# Patient Record
Sex: MALE | Race: WHITE | URBAN - METROPOLITAN AREA
[De-identification: names, ages, dates, MRNs, and addresses within clinical notes are randomized per-mention and may not be internally consistent; named-entity substitution may affect disease eponyms.]

---

## 2024-09-20 ENCOUNTER — OCCMED (OUTPATIENT)
Dept: URGENT CARE | Facility: CLINIC | Age: 29
End: 2024-09-20

## 2024-09-20 DIAGNOSIS — Z02.89 ENCOUNTER FOR EXAMINATION REQUIRED BY DEPARTMENT OF TRANSPORTATION (DOT): Primary | ICD-10-CM

## 2025-03-17 ENCOUNTER — OFFICE VISIT (OUTPATIENT)
Dept: URGENT CARE | Facility: CLINIC | Age: 30
End: 2025-03-17
Payer: COMMERCIAL

## 2025-03-17 VITALS
WEIGHT: 203 LBS | OXYGEN SATURATION: 98 % | RESPIRATION RATE: 18 BRPM | HEIGHT: 69 IN | DIASTOLIC BLOOD PRESSURE: 78 MMHG | SYSTOLIC BLOOD PRESSURE: 124 MMHG | TEMPERATURE: 99 F | HEART RATE: 94 BPM | BODY MASS INDEX: 30.07 KG/M2

## 2025-03-17 DIAGNOSIS — A08.4 VIRAL GASTROENTERITIS: Primary | ICD-10-CM

## 2025-03-17 PROCEDURE — 99213 OFFICE O/P EST LOW 20 MIN: CPT | Performed by: PHYSICIAN ASSISTANT

## 2025-03-17 RX ORDER — HYDROXYZINE HYDROCHLORIDE 25 MG/1
25 TABLET, FILM COATED ORAL
COMMUNITY
Start: 2024-09-30

## 2025-03-17 RX ORDER — ONDANSETRON 4 MG/1
4 TABLET, FILM COATED ORAL EVERY 8 HOURS PRN
Qty: 20 TABLET | Refills: 0 | Status: SHIPPED | OUTPATIENT
Start: 2025-03-17

## 2025-03-17 RX ORDER — ARIPIPRAZOLE 5 MG/1
2.5 TABLET ORAL
COMMUNITY
Start: 2025-01-02

## 2025-03-17 NOTE — PROGRESS NOTES
[facility]        NAME: Jeremie Keita is a 30 y.o. male  : 1995    MRN: [id number]  DATE: 2025  TIME: 10:58 AM    Assessment and Plan   Viral gastroenteritis [A08.4]  1. Viral gastroenteritis  ondansetron (ZOFRAN) 4 mg tablet            Patient Instructions     Patient Instructions   Patient's symptoms are consistent with a viral stomach bug.    Recommended Zofran use as needed for nausea and vomiting.  Advised to stay hydrated and drink plenty of fluids.    Follow up with PCP in 3-5 days.  Proceed to  ER if symptoms worsen.    If tests are performed, our office will contact you with results only if changes need to made to the care plan discussed with you at the visit. You can review your full results on [facility].        Chief Complaint     Chief Complaint   Patient presents with    Abdominal Pain     Pt states he has abdominal pain, diarrhea and back pain for 2 days.          History of Present Illness       Abdominal Pain  This is a new problem. Episode onset: 2 days ago. The onset quality is sudden. The problem occurs constantly. The problem has been unchanged. The pain is located in the generalized abdominal region. The pain is mild. The quality of the pain is aching and cramping. The abdominal pain radiates to the back. Associated symptoms include diarrhea. Pertinent negatives include no anorexia, constipation, fever, nausea or vomiting. The pain is aggravated by certain positions. The pain is relieved by Nothing.       Review of Systems   Review of Systems   Constitutional:  Negative for fever.   Gastrointestinal:  Positive for abdominal pain and diarrhea. Negative for anorexia, constipation, nausea and vomiting.   All other systems reviewed and are negative.        Current Medications       Current Outpatient Medications:     ARIPiprazole (ABILIFY) 5 mg tablet, Take 2.5 mg by mouth, Disp: , Rfl:     hydrOXYzine HCL (ATARAX) 25 mg tablet, Take 25 mg by mouth, Disp: ,  "Rfl:     ondansetron (ZOFRAN) 4 mg tablet, Take 1 tablet (4 mg total) by mouth every 8 (eight) hours as needed for nausea or vomiting, Disp: 20 tablet, Rfl: 0    Current Allergies     Allergies as of 03/17/2025 - Reviewed 03/17/2025   Allergen Reaction Noted    Buspirone Anxiety 07/12/2024            The following portions of the patient's history were reviewed and updated as appropriate: allergies, current medications, past family history, past medical history, past social history, past surgical history and problem list.     Past Medical History:   Diagnosis Date    Depression        History reviewed. No pertinent surgical history.    History reviewed. No pertinent family history.      Medications have been verified.        Objective   /78   Pulse 94   Temp 99 °F (37.2 °C)   Resp 18   Ht 5' 9\" (1.753 m)   Wt 92.1 kg (203 lb)   SpO2 98%   BMI 29.98 kg/m²        Physical Exam     Physical Exam  Vitals and nursing note reviewed.   Constitutional:       Appearance: Normal appearance. He is well-developed.   HENT:      Right Ear: Tympanic membrane, ear canal and external ear normal.      Left Ear: Tympanic membrane, ear canal and external ear normal.      Nose: Nose normal.      Mouth/Throat:      Mouth: Mucous membranes are moist.   Cardiovascular:      Rate and Rhythm: Normal rate and regular rhythm.   Pulmonary:      Effort: Pulmonary effort is normal.      Breath sounds: Normal breath sounds.   Abdominal:      General: Abdomen is flat. Bowel sounds are normal.      Palpations: Abdomen is soft.      Tenderness: There is abdominal tenderness in the epigastric area, periumbilical area, left upper quadrant and left lower quadrant.   Skin:     General: Skin is warm and dry.      Capillary Refill: Capillary refill takes less than 2 seconds.   Neurological:      General: No focal deficit present.      Mental Status: He is alert and oriented to person, place, and time.   Psychiatric:         Mood and Affect: " Mood normal.         Behavior: Behavior normal.

## 2025-03-17 NOTE — PATIENT INSTRUCTIONS
Patient's symptoms are consistent with a viral stomach bug.    Recommended Zofran use as needed for nausea and vomiting.  Advised to stay hydrated and drink plenty of fluids.    Follow up with PCP in 3-5 days.  Proceed to  ER if symptoms worsen.    If tests are performed, our office will contact you with results only if changes need to made to the care plan discussed with you at the visit. You can review your full results on [facility].

## 2025-08-07 ENCOUNTER — APPOINTMENT (OUTPATIENT)
Dept: RADIOLOGY | Facility: CLINIC | Age: 30
End: 2025-08-07
Attending: PHYSICIAN ASSISTANT
Payer: COMMERCIAL

## 2025-08-07 ENCOUNTER — OFFICE VISIT (OUTPATIENT)
Dept: URGENT CARE | Facility: CLINIC | Age: 30
End: 2025-08-07
Payer: COMMERCIAL

## 2025-08-07 VITALS
SYSTOLIC BLOOD PRESSURE: 108 MMHG | TEMPERATURE: 97.6 F | HEART RATE: 84 BPM | WEIGHT: 200 LBS | DIASTOLIC BLOOD PRESSURE: 72 MMHG | RESPIRATION RATE: 18 BRPM | OXYGEN SATURATION: 99 % | BODY MASS INDEX: 29.53 KG/M2

## 2025-08-07 DIAGNOSIS — M20.011 MALLET FINGER OF RIGHT FINGER(S): Primary | ICD-10-CM

## 2025-08-07 DIAGNOSIS — M79.644 FINGER PAIN, RIGHT: ICD-10-CM

## 2025-08-07 PROCEDURE — 99213 OFFICE O/P EST LOW 20 MIN: CPT | Performed by: PHYSICIAN ASSISTANT

## 2025-08-07 PROCEDURE — 29130 APPL FINGER SPLINT STATIC: CPT | Performed by: PHYSICIAN ASSISTANT
